# Patient Record
Sex: MALE | Employment: OTHER | URBAN - METROPOLITAN AREA
[De-identification: names, ages, dates, MRNs, and addresses within clinical notes are randomized per-mention and may not be internally consistent; named-entity substitution may affect disease eponyms.]

---
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
## 2025-06-04 ENCOUNTER — OFFICE VISIT (OUTPATIENT)
Dept: FAMILY MEDICINE CLINIC | Facility: CLINIC | Age: 20
End: 2025-06-04
Payer: COMMERCIAL

## 2025-06-04 VITALS
HEART RATE: 87 BPM | SYSTOLIC BLOOD PRESSURE: 130 MMHG | TEMPERATURE: 97 F | DIASTOLIC BLOOD PRESSURE: 84 MMHG | BODY MASS INDEX: 29.43 KG/M2 | WEIGHT: 210.19 LBS | RESPIRATION RATE: 16 BRPM | OXYGEN SATURATION: 97 % | HEIGHT: 71 IN

## 2025-06-04 DIAGNOSIS — Z23 NEED FOR VACCINATION: ICD-10-CM

## 2025-06-04 DIAGNOSIS — Z00.00 ANNUAL PHYSICAL EXAM: Primary | ICD-10-CM

## 2025-06-04 DIAGNOSIS — B35.6 TINEA CRURIS: ICD-10-CM

## 2025-06-04 PROCEDURE — 99395 PREV VISIT EST AGE 18-39: CPT

## 2025-06-04 PROCEDURE — 3079F DIAST BP 80-89 MM HG: CPT

## 2025-06-04 PROCEDURE — 90620 MENB-4C VACCINE IM: CPT

## 2025-06-04 PROCEDURE — 3008F BODY MASS INDEX DOCD: CPT

## 2025-06-04 PROCEDURE — 90471 IMMUNIZATION ADMIN: CPT

## 2025-06-04 PROCEDURE — 3075F SYST BP GE 130 - 139MM HG: CPT

## 2025-06-04 RX ORDER — CLOTRIMAZOLE 1 %
1 CREAM (GRAM) TOPICAL 2 TIMES DAILY
Qty: 28 G | Refills: 1 | Status: SHIPPED | OUTPATIENT
Start: 2025-06-04 | End: 2025-06-18

## 2025-06-04 NOTE — PATIENT INSTRUCTIONS
Apply clotrimazole twice a day  Keep area clean and dry to prevent spreading  Reach out if you experience any reaction

## 2025-06-04 NOTE — PROGRESS NOTES
Chief Complaint   Patient presents with    Physical     No concerns          HPI  Wade Bassett is a 20 year old male here for physical.    Last PSA: n/a    Last Colon Cancer screening:  n/a    Acute concerns:  Red rash around hip area    Discussed:  - diet: Balanced, sweet treats  - exercise: gym  - sleep: adequate  - stress: no concerns  - vision: UTD  - dentist visit: UTD  - STD screening: declined    ROS  As per HPI      Depression Screening (PHQ-2/PHQ-9): Over the LAST 2 WEEKS   Little interest or pleasure in doing things: Not at all    Feeling down, depressed, or hopeless: Not at all    PHQ-2 SCORE: 0          Past Medical History[1]    Past Surgical History[2]    Social Hx on file[3]    Family History[4]     Medications Ordered Prior to Encounter[5]    Immunization History   Administered Date(s) Administered    Covid-19 Vaccine Pfizer 30 mcg/0.3 ml 05/27/2021, 06/17/2021, 01/21/2022    DTAP 06/27/2005, 08/29/2005, 11/03/2005, 10/27/2006, 05/06/2010    HEP A 05/06/2010, 06/24/2013    HEP B 06/27/2005, 08/29/2005, 11/03/2005    HIB 06/27/2005, 08/29/2005, 11/03/2005, 10/27/2006    Hpv Virus Vaccine 9 Archana Im 06/27/2018, 12/28/2018    IPV 06/27/2005, 08/29/2005, 11/03/2005, 10/27/2006    MMR 07/31/2006, 05/06/2010    Meningococcal-Menactra 06/27/2016    Meningococcal-Menveo 2month-55yr 08/23/2021    Pneumococcal (Prevnar 7) 06/27/2005, 08/29/2005, 11/03/2005, 10/27/2006    TDAP 06/27/2016    Varicella 07/31/2006, 06/24/2013   Pended Date(s) Pended    Meningococcal B, Omv 06/04/2025           Objective  Vitals:    06/04/25 1332   BP: 130/84   Pulse: 87   Resp: 16   Temp: 97 °F (36.1 °C)   SpO2: 97%   Weight: 210 lb 3.2 oz (95.3 kg)   Height: 5' 11\" (1.803 m)   Body mass index is 29.32 kg/m².    Physical Exam  Constitutional:       Appearance: Normal appearance.   HEENT:      Head: Normocephalic and atraumatic.      Eyes: PERRLA no notable nystagmus     Ears: normal on observation     Nose: Nose normal.       Mouth: Mucous membranes are moist.      Neck: no lymphadenopathy  Cardiovascular:      Rate and Rhythm: Normal rate and regular rhythm.   Pulmonary:      Effort: Pulmonary effort is normal.      Breath sounds: Normal breath sounds.   Abdominal:      General: Bowel sounds are normal.      Palpations: Abdomen is soft. There is no mass.   Musculoskeletal:         General: Normal range of motion.   Skin:     General: Skin is warm and dry. +erythematous circular rash in groin area  Neurological:      General: No focal deficit present.      Mental Status: Alert and oriented to person, place, and time.   Psychiatric:         Mood and Affect: Mood normal.         Thought Content: Thought content normal.     Assessment and Plan  Wade was seen today for physical.    Diagnoses and all orders for this visit:    Annual physical exam  -     Cancel: TSH W Reflex To Free T4; Future  -     Cancel: Lipid Panel; Future  -     Cancel: Comp Metabolic Panel (14); Future  -     Cancel: CBC With Differential With Platelet; Future  -     Cancel: Hemoglobin A1C [E]; Future    Need for vaccination  -     MenB (Bexsero) - Meningococcal B [80339]    Tinea cruris  -     clotrimazole 1 % External Cream; Apply 1 Application topically 2 (two) times daily for 14 days.         Patient presents for annual exam  - General labs: ordered, awaiting results  - Discussed signing up for patient portal as means of communicating with me directly  - Discussed importance of communicating with me if has not heard back with results, etc  - Discussed age-appropriate vaccinations and screenings  - Pt verbalizes understanding, all questions/concerns addressed, in agreement w/plan    Follow up  Return in about 1 year (around 6/4/2026) for annual physical.      Patient Instructions  Patient Instructions   Apply clotrimazole twice a day  Keep area clean and dry to prevent spreading  Reach out if you experience any reaction          Eliana Kumar MD        [1]  History reviewed. No pertinent past medical history.  [2]   Past Surgical History:  Procedure Laterality Date    Hc implant ear tubes      Other      tubes bilateral ears   [3]   Social History  Socioeconomic History    Marital status: Single   Tobacco Use    Smoking status: Never    Smokeless tobacco: Never   Vaping Use    Vaping status: Never Used   Substance and Sexual Activity    Alcohol use: Yes     Alcohol/week: 1.0 standard drink of alcohol     Types: 1 Cans of beer per week    Drug use: No    Sexual activity: Not Currently     Partners: Female     Birth control/protection: Condom   Other Topics Concern    Caffeine Concern Yes     Comment: daily soda    Exercise Yes     Comment: age appropriate/ lift    Seat Belt Yes   [4]   Family History  Problem Relation Age of Onset    High Cholesterol Father     Hypertension Father     Clotting Disorder Father     Diabetes Mother     Diabetes Maternal Grandmother     Cancer Maternal Grandfather         lung /liver    Heart Disorder Paternal Grandfather     Stroke Other     Heart Disease Other    [5]   Current Outpatient Medications on File Prior to Visit   Medication Sig Dispense Refill    Multiple Vitamins-Minerals (MULTIVITAMIN ADULTS OR) Take by mouth.       No current facility-administered medications on file prior to visit.